# Patient Record
Sex: FEMALE | Race: WHITE
[De-identification: names, ages, dates, MRNs, and addresses within clinical notes are randomized per-mention and may not be internally consistent; named-entity substitution may affect disease eponyms.]

---

## 2017-05-25 ENCOUNTER — HOSPITAL ENCOUNTER (OUTPATIENT)
Dept: HOSPITAL 45 - C.MAMM | Age: 42
Discharge: HOME | End: 2017-05-25
Attending: NURSE PRACTITIONER
Payer: COMMERCIAL

## 2017-05-25 DIAGNOSIS — Z12.31: Primary | ICD-10-CM

## 2017-05-26 NOTE — MAMMOGRAPHY REPORT
BILATERAL DIGITAL SCREENING MAMMOGRAM TOMOSYNTHESIS WITH CAD: 5/25/2017

CLINICAL HISTORY: Routine screening.  Patient has no complaints.  





TECHNIQUE:  Breast tomosynthesis in addition to standard 2D mammography was performed. Current study
 was also evaluated with a Computer Aided Detection (CAD) system.  



COMPARISON: Comparison is made to exam dated:  10/8/2015 mammogram - [facility].  The outside images were not able to be loaded into the PACS and could only be visualized
 directly from the CD. Only the C views were available for review. 



BREAST COMPOSITION:  There are scattered areas of fibroglandular density in both breasts.  



FINDINGS:  No suspicious masses, calcifications, or areas of architectural distortion are noted in e
ither breast. There has been no significant interval change compared to prior exams.  



IMPRESSION:  ACR BI-RADS CATEGORY 1: NEGATIVE

There is no mammographic evidence of malignancy. A 1 year screening mammogram is recommended.  The p
atient will receive written notification of the results.  





Approximately 10% of breast cancers are not detected with mammography. A negative mammographic repor
t should not delay biopsy if a clinically suggestive mass is present.



Gill Coker M.D.          

ah/:5/25/2017 16:14:27  



Imaging Technologist: Joy PEARSON)(YARA), [facility]

letter sent: Normal 1/2  

BI-RADS Code: ACR BI-RADS Category 1: Negative

## 2017-09-21 ENCOUNTER — HOSPITAL ENCOUNTER (OUTPATIENT)
Dept: HOSPITAL 45 - C.PAPS | Age: 42
Discharge: HOME | End: 2017-09-21
Attending: PHYSICIAN ASSISTANT
Payer: COMMERCIAL

## 2017-09-21 DIAGNOSIS — Z11.51: ICD-10-CM

## 2017-09-21 DIAGNOSIS — Z01.419: Primary | ICD-10-CM

## 2017-12-18 ENCOUNTER — HOSPITAL ENCOUNTER (OUTPATIENT)
Dept: HOSPITAL 45 - C.LABBC | Age: 42
Discharge: HOME | End: 2017-12-18
Attending: FAMILY MEDICINE
Payer: COMMERCIAL

## 2017-12-18 DIAGNOSIS — Z00.00: Primary | ICD-10-CM

## 2017-12-18 DIAGNOSIS — E66.9: ICD-10-CM

## 2017-12-18 LAB
CHOLEST/HDLC SERPL: 3.3 {RATIO}
GLUCOSE UR QL: 62 MG/DL
KETONES UR QL STRIP: 119 MG/DL
NITRITE UR QL STRIP: 113 MG/DL (ref 0–150)
PH UR: 204 MG/DL (ref 0–200)
VERY LOW DENSITY LIPOPROT CALC: 23 MG/DL

## 2018-08-02 ENCOUNTER — HOSPITAL ENCOUNTER (OUTPATIENT)
Dept: HOSPITAL 45 - C.PATHSPEC | Age: 43
Discharge: HOME | End: 2018-08-02
Attending: SURGERY
Payer: COMMERCIAL

## 2018-08-02 DIAGNOSIS — D23.61: Primary | ICD-10-CM
